# Patient Record
Sex: FEMALE | Race: WHITE | Employment: FULL TIME | URBAN - METROPOLITAN AREA
[De-identification: names, ages, dates, MRNs, and addresses within clinical notes are randomized per-mention and may not be internally consistent; named-entity substitution may affect disease eponyms.]

---

## 2020-03-09 ENCOUNTER — HOSPITAL ENCOUNTER (OUTPATIENT)
Dept: GENERAL RADIOLOGY | Age: 40
Discharge: HOME | End: 2020-03-09
Attending: INTERNAL MEDICINE
Payer: COMMERCIAL

## 2020-03-09 DIAGNOSIS — R13.10 DYSPHAGIA, UNSPECIFIED: ICD-10-CM

## 2020-03-09 PROCEDURE — 74011000255 HC RX REV CODE- 255: Performed by: INTERNAL MEDICINE

## 2020-03-09 PROCEDURE — 74220 X-RAY XM ESOPHAGUS 1CNTRST: CPT

## 2020-03-09 PROCEDURE — 74011000250 HC RX REV CODE- 250: Performed by: INTERNAL MEDICINE

## 2020-03-09 RX ADMIN — BARIUM SULFATE 700 MG: 700 TABLET ORAL at 08:40

## 2020-03-09 RX ADMIN — BARIUM SULFATE 355 ML: 0.6 SUSPENSION ORAL at 08:40

## 2020-03-09 RX ADMIN — ANTACID/ANTIFLATULENT 4 G: 380; 550; 10; 10 GRANULE, EFFERVESCENT ORAL at 08:40

## 2020-03-09 RX ADMIN — BARIUM SULFATE 135 ML: 980 POWDER, FOR SUSPENSION ORAL at 08:40

## 2020-05-01 ENCOUNTER — HOSPITAL ENCOUNTER (OUTPATIENT)
Dept: LAB | Age: 40
Discharge: HOME | End: 2020-05-01

## 2020-05-01 PROCEDURE — 88312 SPECIAL STAINS GROUP 1: CPT

## 2020-05-01 PROCEDURE — 88305 TISSUE EXAM BY PATHOLOGIST: CPT

## 2020-10-09 RX ORDER — LIDOCAINE HYDROCHLORIDE 20 MG/ML
15 SOLUTION OROPHARYNGEAL AS NEEDED
Status: CANCELLED | OUTPATIENT
Start: 2020-10-09

## 2020-11-06 NOTE — PROGRESS NOTES
Called pt at this time to remind her of scheduled procedure on Monday. Instructed pt to arrive at the outpatient building 30 min ahead of scheduled procedure. Pt verbalized understanding.

## 2020-11-09 ENCOUNTER — HOSPITAL ENCOUNTER (OUTPATIENT)
Age: 40
Setting detail: OUTPATIENT SURGERY
Discharge: HOME | End: 2020-11-09
Attending: INTERNAL MEDICINE | Admitting: INTERNAL MEDICINE
Payer: COMMERCIAL

## 2020-11-09 PROCEDURE — 91034 GASTROESOPHAGEAL REFLUX TEST: CPT | Performed by: INTERNAL MEDICINE

## 2020-11-09 PROCEDURE — 74011000250 HC RX REV CODE- 250: Performed by: INTERNAL MEDICINE

## 2020-11-09 PROCEDURE — 91010 ESOPHAGUS MOTILITY STUDY: CPT | Performed by: INTERNAL MEDICINE

## 2020-11-09 PROCEDURE — 77030031717 HC CATH PRB COMFORTEC DISP SAND -B: Performed by: INTERNAL MEDICINE

## 2020-11-09 PROCEDURE — 2709999900 HC NON-CHARGEABLE SUPPLY: Performed by: INTERNAL MEDICINE

## 2020-11-09 RX ORDER — OMEPRAZOLE 40 MG/1
40 CAPSULE, DELAYED RELEASE ORAL DAILY
COMMUNITY

## 2020-11-09 RX ORDER — LIDOCAINE HYDROCHLORIDE 20 MG/ML
15 SOLUTION OROPHARYNGEAL AS NEEDED
Status: DISCONTINUED | OUTPATIENT
Start: 2020-11-09 | End: 2020-11-09 | Stop reason: HOSPADM

## 2020-11-09 RX ADMIN — LIDOCAINE HYDROCHLORIDE 15 ML: 20 SOLUTION ORAL; TOPICAL at 08:05

## 2020-11-09 NOTE — PROGRESS NOTES
Pt tolerated Manometry probe insertion at 45 cm. Test performed w/o issue. Pt tolerated pH probe insertion at 36 cm, gastric pH 2.3. Instructions given to patient and recording device explained. Pt instructed to return tomorrow after 11/10/20 at 845am for probe removal and bring paperwork with. Pt d/c in personal car.

## 2020-11-10 ENCOUNTER — HOSPITAL ENCOUNTER (OUTPATIENT)
Age: 40
Setting detail: OUTPATIENT SURGERY
Discharge: HOME | End: 2020-11-10
Attending: INTERNAL MEDICINE | Admitting: INTERNAL MEDICINE
Payer: COMMERCIAL

## 2020-11-10 PROCEDURE — 99211 OFF/OP EST MAY X REQ PHY/QHP: CPT | Performed by: INTERNAL MEDICINE

## 2020-11-10 NOTE — PROGRESS NOTES
Patient arrived to have [facility] 30 probe removed. Probe removed without difficulty and no complaints from patient.

## 2020-11-12 NOTE — PROCEDURES
[facility]  PROCEDURE NOTE    Name:  Xochilt Moran  MR#:  [id number]  :  1980  ACCOUNT #:  [de-identified]  DATE OF SERVICE:  2020    PREOPERATIVE DIAGNOSES:  Dysphagia and aspiration. POSTOPERATIVE DIAGNOSES:  1. Abnormal acid reflux with 9% upright and 6.8% total acidification time. 2.  DeMeester score 22.5, which is elevated above normal (less than 14.7). 3.  Increased liquid impedance episodes with 96 total.  4.  Association probability for cough 57%, phlegm 99.5% and dysphagia 100%. PROCEDURE PERFORMED:  24-hour pH impedance probe study. PROCEDURE:  After obtaining informed consent, the patient underwent nasal intubation with manometric probe. The pH was acidic and the patient was off antisecretory medication for over a weak. The duration of the study was 23 hours 9 minutes of which 8 hours 5 minutes were recumbent, 15 hours 4 minutes were upright. During this time, the patient had 54 upright and one recumbent episode of acid reflux. The recumbent episode was actually 13.8 minutes, which resulted in 2.8% acidification while supine, which is above the normal of less than 1.2%. The majority of the reflux was while upright for a 9% acidification time resulting in a 6.8% total acidification time, well above the normal range of 4.2. This led to a composite DeMeester score of 22.5, which is above the normal of 14.7. Impedance data showed 96 liquid reflux episodes with normal less than 73 while off antisecretory medication. 77 were acidic, 19 were non-acidic; 95 were upright and one recumbent. During this time, the patient had 14 episodes of cough, 17 of phlegm, 59 of dysphagia, one of regurgitation. The correlation with impedance liquid events was symptom index of 57% for cough, 71% for phlegm, 76% for dysphagia, 0% for regurgitation and vomiting. The association probability was 57% for cough, 99.5% for phlegm and 100% for dysphagia.   The patient's definition of dysphagia would be of interest here as they had 59 episodes during this brief study. I wonder if globus sensation is what they are describing, but it is clearly quite well correlated with acidic and non-acidic impedance reflux. DISPOSITION:  Further followup per Dr. Samule Shone.       Amena Barakat MD      [id number]  D:  11/12/2020 9:31  T:  11/12/2020 11:48  JOB #:  [id number]  CC:  MD Renetta Velazquez MD

## 2020-11-12 NOTE — PROCEDURES
[address] [facility]  PROCEDURE NOTE    Name:  Julissa Carcamo  MR#:  [id number]  :  1980  ACCOUNT #:  [de-identified]  DATE OF SERVICE:  2020    PREOPERATIVE DIAGNOSES:  Dysphagia and aspiration. POSTOPERATIVE DIAGNOSES:  1. Hiatal hernia present - 4.1 cm.  2.  Normal lower esophageal sphincter pressure and relaxation. 3.  Nonspecific motility disturbance with relatively normal findings for viscous swallows, but 50% of liquid swallows were weak or failed - basically ineffective. PROCEDURE PERFORMED:  High-resolution impedance manometry. SURGEON:  Kera Nagy MD    PROCEDURE:  After obtaining informed consent, the patient underwent nasal intubation with manometric probe. The lower esophageal sphincter was located between 41.4 and 44.4 cm. There was a relatively large hiatal hernia of about 4.1 cm present. The lower esophageal sphincter averaged 17 mmHg at mid respiration. The IRP averaged 6 indicating normal contractility at rest and relaxation with wet swallow. 10 wet swallows showed that virtually every other one failed. They would be normal and then failed normal than failed with vigorous contractions on the normal swallows. The DCI was around 2000 for the normal swallows and completely failed for the abnormal giving a mean DCI of 1231. Impedance data showed only 30% of the liquid swallows had complete transit with an exit. 80% of viscous swallows showed an exit. The motility was definitely improved with viscous swallows. The upper esophageal sphincter pressure was 53, which is within normal limits. DISPOSITION:  Further followup per Dr. Abhijeet Simmons.       Glenna Wren MD      [id number]  D:  2020 15:49  T:  2020 1:16  JOB #:  [id number]  CC:  MD Janneth Vasquez MD

## 2020-12-03 ENCOUNTER — APPOINTMENT (RX ONLY)
Dept: URBAN - METROPOLITAN AREA CLINIC 24 | Facility: CLINIC | Age: 40
Setting detail: DERMATOLOGY
End: 2020-12-03

## 2020-12-03 DIAGNOSIS — L72.0 EPIDERMAL CYST: ICD-10-CM

## 2020-12-03 DIAGNOSIS — D485 NEOPLASM OF UNCERTAIN BEHAVIOR OF SKIN: ICD-10-CM

## 2020-12-03 PROBLEM — D48.5 NEOPLASM OF UNCERTAIN BEHAVIOR OF SKIN: Status: ACTIVE | Noted: 2020-12-03

## 2020-12-03 PROCEDURE — 11102 TANGNTL BX SKIN SINGLE LES: CPT

## 2020-12-03 PROCEDURE — ? BIOPSY BY SHAVE METHOD

## 2020-12-03 ASSESSMENT — LOCATION DETAILED DESCRIPTION DERM
LOCATION DETAILED: LEFT ANTERIOR SHOULDER
LOCATION DETAILED: LEFT CENTRAL MALAR CHEEK

## 2020-12-03 ASSESSMENT — LOCATION SIMPLE DESCRIPTION DERM
LOCATION SIMPLE: LEFT CHEEK
LOCATION SIMPLE: LEFT SHOULDER

## 2020-12-03 ASSESSMENT — LOCATION ZONE DERM
LOCATION ZONE: FACE
LOCATION ZONE: ARM

## 2020-12-03 NOTE — PROCEDURE: BIOPSY BY SHAVE METHOD
Notification Instructions: Patient will be notified of biopsy results. However, patient instructed to call the office if not contacted within 2 weeks.
Validate That Anesthesia Is Not 0: No
Cryotherapy Text: The wound bed was treated with cryotherapy after the biopsy was performed.
Was A Bandage Applied: Yes
Electrodesiccation Text: The wound bed was treated with electrodesiccation after the biopsy was performed.
Billing Type: Third-Party Bill
Accession #: [id number]
Post-Care Instructions: I reviewed with the patient in detail post-care instructions. Patient is to keep the biopsy site dry overnight, and then apply bacitracin twice daily until healed. Patient may apply hydrogen peroxide soaks to remove any crusting.
Hemostasis: Electrocautery
Biopsy Type: H and E
Electrodesiccation And Curettage Text: The wound bed was treated with electrodesiccation and curettage after the biopsy was performed.
Wound Care: Petrolatum
Silver Nitrate Text: The wound bed was treated with silver nitrate after the biopsy was performed.
Type Of Destruction Used: Curettage
Biopsy Method: Dermablade
Dressing: bandage
Anesthesia Type: 1% lidocaine without epinephrine
Depth Of Biopsy: dermis
Information: Selecting Yes will display possible errors in your note based on the variables you have selected. This validation is only offered as a suggestion for you. PLEASE NOTE THAT THE VALIDATION TEXT WILL BE REMOVED WHEN YOU FINALIZE YOUR NOTE. IF YOU WANT TO FAX A PRELIMINARY NOTE YOU WILL NEED TO TOGGLE THIS TO 'NO' IF YOU DO NOT WANT IT IN YOUR FAXED NOTE.
Path Notes (To The Dermatopathologist): Please check margins.
Additional Anesthesia Volume In Cc (Will Not Render If 0): 0
Consent: Written consent was obtained and risks were reviewed including but not limited to scarring, infection, bleeding, scabbing, incomplete removal, nerve damage and allergy to anesthesia.
Anesthesia Volume In Cc: 0.8
Detail Level: Detailed

## 2020-12-17 ENCOUNTER — APPOINTMENT (RX ONLY)
Dept: URBAN - METROPOLITAN AREA CLINIC 24 | Facility: CLINIC | Age: 40
Setting detail: DERMATOLOGY
End: 2020-12-17

## 2020-12-17 DIAGNOSIS — L81.4 OTHER MELANIN HYPERPIGMENTATION: ICD-10-CM

## 2020-12-17 DIAGNOSIS — Z71.89 OTHER SPECIFIED COUNSELING: ICD-10-CM

## 2020-12-17 DIAGNOSIS — D22 MELANOCYTIC NEVI: ICD-10-CM

## 2020-12-17 DIAGNOSIS — D485 NEOPLASM OF UNCERTAIN BEHAVIOR OF SKIN: ICD-10-CM

## 2020-12-17 DIAGNOSIS — D18.0 HEMANGIOMA: ICD-10-CM

## 2020-12-17 DIAGNOSIS — L72.0 EPIDERMAL CYST: ICD-10-CM

## 2020-12-17 PROBLEM — D18.01 HEMANGIOMA OF SKIN AND SUBCUTANEOUS TISSUE: Status: ACTIVE | Noted: 2020-12-17

## 2020-12-17 PROBLEM — D48.5 NEOPLASM OF UNCERTAIN BEHAVIOR OF SKIN: Status: ACTIVE | Noted: 2020-12-17

## 2020-12-17 PROBLEM — J30.1 ALLERGIC RHINITIS DUE TO POLLEN: Status: ACTIVE | Noted: 2020-12-17

## 2020-12-17 PROBLEM — D22.72 MELANOCYTIC NEVI OF LEFT LOWER LIMB, INCLUDING HIP: Status: ACTIVE | Noted: 2020-12-17

## 2020-12-17 PROBLEM — D23.71 OTHER BENIGN NEOPLASM OF SKIN OF RIGHT LOWER LIMB, INCLUDING HIP: Status: ACTIVE | Noted: 2020-12-17

## 2020-12-17 PROCEDURE — 99213 OFFICE O/P EST LOW 20 MIN: CPT | Mod: 25

## 2020-12-17 PROCEDURE — ? BIOPSY BY SHAVE METHOD

## 2020-12-17 PROCEDURE — 11102 TANGNTL BX SKIN SINGLE LES: CPT

## 2020-12-17 PROCEDURE — ? COUNSELING

## 2020-12-17 ASSESSMENT — LOCATION SIMPLE DESCRIPTION DERM
LOCATION SIMPLE: RIGHT CALF
LOCATION SIMPLE: LEFT PLANTAR SURFACE
LOCATION SIMPLE: RIGHT SHOULDER
LOCATION SIMPLE: UPPER BACK
LOCATION SIMPLE: LEFT EYELID
LOCATION SIMPLE: RIGHT THIGH
LOCATION SIMPLE: LEFT SHOULDER

## 2020-12-17 ASSESSMENT — LOCATION DETAILED DESCRIPTION DERM
LOCATION DETAILED: SUPERIOR THORACIC SPINE
LOCATION DETAILED: LEFT LATERAL CANTHUS
LOCATION DETAILED: RIGHT POSTERIOR SHOULDER
LOCATION DETAILED: RIGHT ANTERIOR PROXIMAL THIGH
LOCATION DETAILED: RIGHT DISTAL LATERAL CALF
LOCATION DETAILED: LEFT PLANTAR FOREFOOT OVERLYING 2ND METATARSAL
LOCATION DETAILED: LEFT POSTERIOR SHOULDER

## 2020-12-17 ASSESSMENT — LOCATION ZONE DERM
LOCATION ZONE: TRUNK
LOCATION ZONE: EYELID
LOCATION ZONE: LEG
LOCATION ZONE: ARM
LOCATION ZONE: FEET

## 2020-12-17 NOTE — PROCEDURE: BIOPSY BY SHAVE METHOD
Silver Nitrate Text: The wound bed was treated with silver nitrate after the biopsy was performed.
Post-Care Instructions: I reviewed with the patient in detail post-care instructions. Patient is to keep the biopsy site dry overnight, and then apply bacitracin twice daily until healed. Patient may apply hydrogen peroxide soaks to remove any crusting.
Validate Anticipated Plan: No
Depth Of Biopsy: dermis
Anesthesia Volume In Cc: 0.8
X Size Of Lesion In Cm: 0
Dressing: bandage
Validate Note Data (See Information Below): Yes
Wound Care: Petrolatum
Cryotherapy Text: The wound bed was treated with cryotherapy after the biopsy was performed.
Path Notes (To The Dermatopathologist): Please check margins.
Electrodesiccation Text: The wound bed was treated with electrodesiccation after the biopsy was performed.
Detail Level: Detailed
Size Of Lesion In Cm: 0.2
Billing Type: Third-Party Bill
Electrodesiccation And Curettage Text: The wound bed was treated with electrodesiccation and curettage after the biopsy was performed.
Biopsy Method: Dermablade
Anesthesia Type: 1% lidocaine without epinephrine
Consent: Written consent was obtained and risks were reviewed including but not limited to scarring, infection, bleeding, scabbing, incomplete removal, nerve damage and allergy to anesthesia.
Type Of Destruction Used: Curettage
Notification Instructions: Patient will be notified of biopsy results. However, patient instructed to call the office if not contacted within 2 weeks.
Accession #: [id number]
Information: Selecting Yes will display possible errors in your note based on the variables you have selected. This validation is only offered as a suggestion for you. PLEASE NOTE THAT THE VALIDATION TEXT WILL BE REMOVED WHEN YOU FINALIZE YOUR NOTE. IF YOU WANT TO FAX A PRELIMINARY NOTE YOU WILL NEED TO TOGGLE THIS TO 'NO' IF YOU DO NOT WANT IT IN YOUR FAXED NOTE.
Biopsy Type: H and E
Hemostasis: Electrocautery

## 2021-12-16 ENCOUNTER — APPOINTMENT (RX ONLY)
Dept: URBAN - METROPOLITAN AREA CLINIC 24 | Facility: CLINIC | Age: 41
Setting detail: DERMATOLOGY
End: 2021-12-16

## 2021-12-16 DIAGNOSIS — D22 MELANOCYTIC NEVI: ICD-10-CM

## 2021-12-16 DIAGNOSIS — L91.8 OTHER HYPERTROPHIC DISORDERS OF THE SKIN: ICD-10-CM

## 2021-12-16 DIAGNOSIS — L81.4 OTHER MELANIN HYPERPIGMENTATION: ICD-10-CM

## 2021-12-16 DIAGNOSIS — L85.3 XEROSIS CUTIS: ICD-10-CM

## 2021-12-16 DIAGNOSIS — Z71.89 OTHER SPECIFIED COUNSELING: ICD-10-CM

## 2021-12-16 DIAGNOSIS — D18.0 HEMANGIOMA: ICD-10-CM

## 2021-12-16 DIAGNOSIS — L98.8 OTHER SPECIFIED DISORDERS OF THE SKIN AND SUBCUTANEOUS TISSUE: ICD-10-CM

## 2021-12-16 PROBLEM — D22.72 MELANOCYTIC NEVI OF LEFT LOWER LIMB, INCLUDING HIP: Status: ACTIVE | Noted: 2021-12-16

## 2021-12-16 PROBLEM — L20.84 INTRINSIC (ALLERGIC) ECZEMA: Status: ACTIVE | Noted: 2021-12-16

## 2021-12-16 PROBLEM — D23.71 OTHER BENIGN NEOPLASM OF SKIN OF RIGHT LOWER LIMB, INCLUDING HIP: Status: ACTIVE | Noted: 2021-12-16

## 2021-12-16 PROBLEM — D18.01 HEMANGIOMA OF SKIN AND SUBCUTANEOUS TISSUE: Status: ACTIVE | Noted: 2021-12-16

## 2021-12-16 PROBLEM — F41.9 ANXIETY DISORDER, UNSPECIFIED: Status: ACTIVE | Noted: 2021-12-16

## 2021-12-16 PROCEDURE — ? COUNSELING

## 2021-12-16 PROCEDURE — 99213 OFFICE O/P EST LOW 20 MIN: CPT

## 2021-12-16 ASSESSMENT — LOCATION SIMPLE DESCRIPTION DERM
LOCATION SIMPLE: CHEST
LOCATION SIMPLE: UPPER BACK
LOCATION SIMPLE: RIGHT UPPER BACK
LOCATION SIMPLE: LEFT PLANTAR SURFACE
LOCATION SIMPLE: RIGHT CALF

## 2021-12-16 ASSESSMENT — LOCATION DETAILED DESCRIPTION DERM
LOCATION DETAILED: RIGHT DISTAL LATERAL CALF
LOCATION DETAILED: RIGHT INFERIOR UPPER BACK
LOCATION DETAILED: SUPERIOR THORACIC SPINE
LOCATION DETAILED: LEFT PLANTAR FOREFOOT OVERLYING 2ND METATARSAL
LOCATION DETAILED: MIDDLE STERNUM

## 2021-12-16 ASSESSMENT — LOCATION ZONE DERM
LOCATION ZONE: FEET
LOCATION ZONE: TRUNK
LOCATION ZONE: LEG

## 2025-02-11 ENCOUNTER — HOSPITAL ENCOUNTER (EMERGENCY)
Age: 45
Discharge: HOME | End: 2025-02-11
Attending: EMERGENCY MEDICINE
Payer: COMMERCIAL

## 2025-02-11 ENCOUNTER — APPOINTMENT (OUTPATIENT)
Dept: CT IMAGING | Age: 45
End: 2025-02-11
Payer: COMMERCIAL

## 2025-02-11 VITALS
RESPIRATION RATE: 18 BRPM | DIASTOLIC BLOOD PRESSURE: 93 MMHG | SYSTOLIC BLOOD PRESSURE: 131 MMHG | OXYGEN SATURATION: 100 % | TEMPERATURE: 98.2 F | HEART RATE: 77 BPM

## 2025-02-11 DIAGNOSIS — S06.0X1A CONCUSSION WITH LOSS OF CONSCIOUSNESS OF 30 MINUTES OR LESS, INITIAL ENCOUNTER: ICD-10-CM

## 2025-02-11 DIAGNOSIS — S09.90XA INJURY OF HEAD, INITIAL ENCOUNTER: Primary | ICD-10-CM

## 2025-02-11 LAB
ALBUMIN SERPL-MCNC: 4.1 G/DL (ref 3.5–5)
ALBUMIN/GLOB SERPL: 1.2 (ref 1–1.9)
ALP SERPL-CCNC: 67 U/L (ref 35–104)
ALT SERPL-CCNC: 21 U/L (ref 8–45)
ANION GAP SERPL CALC-SCNC: 15 MMOL/L (ref 7–16)
APTT PPP: 28.6 SEC (ref 23.3–37.4)
AST SERPL-CCNC: 25 U/L (ref 15–37)
BASOPHILS # BLD: 0.07 K/UL (ref 0–0.2)
BASOPHILS NFR BLD: 0.9 % (ref 0–2)
BILIRUB SERPL-MCNC: 0.6 MG/DL (ref 0–1.2)
BUN SERPL-MCNC: 10 MG/DL (ref 6–23)
CALCIUM SERPL-MCNC: 9.7 MG/DL (ref 8.8–10.2)
CHLORIDE SERPL-SCNC: 104 MMOL/L (ref 98–107)
CO2 SERPL-SCNC: 19 MMOL/L (ref 20–29)
CREAT SERPL-MCNC: 0.79 MG/DL (ref 0.6–1.1)
DIFFERENTIAL METHOD BLD: NORMAL
EKG ATRIAL RATE: 77 BPM
EKG DIAGNOSIS: NORMAL
EKG P AXIS: 70 DEGREES
EKG P-R INTERVAL: 133 MS
EKG Q-T INTERVAL: 404 MS
EKG QRS DURATION: 88 MS
EKG QTC CALCULATION (BAZETT): 461 MS
EKG R AXIS: 1 DEGREES
EKG T AXIS: 61 DEGREES
EKG VENTRICULAR RATE: 78 BPM
EOSINOPHIL # BLD: 0.12 K/UL (ref 0–0.8)
EOSINOPHIL NFR BLD: 1.6 % (ref 0.5–7.8)
ERYTHROCYTE [DISTWIDTH] IN BLOOD BY AUTOMATED COUNT: 11.9 % (ref 11.9–14.6)
GLOBULIN SER CALC-MCNC: 3.4 G/DL (ref 2.3–3.5)
GLUCOSE BLD STRIP.AUTO-MCNC: 92 MG/DL (ref 65–100)
GLUCOSE SERPL-MCNC: 97 MG/DL (ref 70–99)
HCT VFR BLD AUTO: 42.8 % (ref 35.8–46.3)
HGB BLD-MCNC: 14.9 G/DL (ref 11.7–15.4)
IMM GRANULOCYTES # BLD AUTO: 0.02 K/UL (ref 0–0.5)
IMM GRANULOCYTES NFR BLD AUTO: 0.3 % (ref 0–5)
INR PPP: 1
LYMPHOCYTES # BLD: 2.64 K/UL (ref 0.5–4.6)
LYMPHOCYTES NFR BLD: 34.1 % (ref 13–44)
MAGNESIUM SERPL-MCNC: 2.3 MG/DL (ref 1.8–2.4)
MCH RBC QN AUTO: 30.4 PG (ref 26.1–32.9)
MCHC RBC AUTO-ENTMCNC: 34.8 G/DL (ref 31.4–35)
MCV RBC AUTO: 87.3 FL (ref 82–102)
MONOCYTES # BLD: 0.76 K/UL (ref 0.1–1.3)
MONOCYTES NFR BLD: 9.8 % (ref 4–12)
NEUTS SEG # BLD: 4.13 K/UL (ref 1.7–8.2)
NEUTS SEG NFR BLD: 53.3 % (ref 43–78)
NRBC # BLD: 0 K/UL (ref 0–0.2)
PLATELET # BLD AUTO: 350 K/UL (ref 150–450)
PMV BLD AUTO: 9.4 FL (ref 9.4–12.3)
POTASSIUM SERPL-SCNC: 4.1 MMOL/L (ref 3.5–5.1)
PROT SERPL-MCNC: 7.6 G/DL (ref 6.3–8.2)
PROTHROMBIN TIME: 13.7 SEC (ref 11.3–14.9)
RBC # BLD AUTO: 4.9 M/UL (ref 4.05–5.2)
SERVICE CMNT-IMP: NORMAL
SODIUM SERPL-SCNC: 138 MMOL/L (ref 136–145)
WBC # BLD AUTO: 7.7 K/UL (ref 4.3–11.1)

## 2025-02-11 PROCEDURE — 80053 COMPREHEN METABOLIC PANEL: CPT

## 2025-02-11 PROCEDURE — 85610 PROTHROMBIN TIME: CPT

## 2025-02-11 PROCEDURE — 93010 ELECTROCARDIOGRAM REPORT: CPT | Performed by: INTERNAL MEDICINE

## 2025-02-11 PROCEDURE — 83735 ASSAY OF MAGNESIUM: CPT

## 2025-02-11 PROCEDURE — 82962 GLUCOSE BLOOD TEST: CPT

## 2025-02-11 PROCEDURE — 85730 THROMBOPLASTIN TIME PARTIAL: CPT

## 2025-02-11 PROCEDURE — 70450 CT HEAD/BRAIN W/O DYE: CPT

## 2025-02-11 PROCEDURE — 72125 CT NECK SPINE W/O DYE: CPT

## 2025-02-11 PROCEDURE — 99284 EMERGENCY DEPT VISIT MOD MDM: CPT

## 2025-02-11 PROCEDURE — 85025 COMPLETE CBC W/AUTO DIFF WBC: CPT

## 2025-02-11 PROCEDURE — 93005 ELECTROCARDIOGRAM TRACING: CPT | Performed by: EMERGENCY MEDICINE

## 2025-02-11 NOTE — ED PROVIDER NOTES
alignment.  On the coronal reconstructions, the odontoid is intact.  On the axial images, the central canal is widely patent at all levels.  The  foramen transversarium are patent. Lung apices are clear.      Impression        No fractures        Electronically signed by Zac Grady   CBC with Auto Differential   Result Value Ref Range    WBC 7.7 4.3 - 11.1 K/uL    RBC 4.90 4.05 - 5.2 M/uL    Hemoglobin 14.9 11.7 - 15.4 g/dL    Hematocrit 42.8 35.8 - 46.3 %    MCV 87.3 82 - 102 FL    MCH 30.4 26.1 - 32.9 PG    MCHC 34.8 31.4 - 35.0 g/dL    RDW 11.9 11.9 - 14.6 %    Platelets 350 150 - 450 K/uL    MPV 9.4 9.4 - 12.3 FL    nRBC 0.00 0.0 - 0.2 K/uL    Differential Type AUTOMATED      Neutrophils % 53.3 43.0 - 78.0 %    Lymphocytes % 34.1 13.0 - 44.0 %    Monocytes % 9.8 4.0 - 12.0 %    Eosinophils % 1.6 0.5 - 7.8 %    Basophils % 0.9 0.0 - 2.0 %    Immature Granulocytes % 0.3 0.0 - 5.0 %    Neutrophils Absolute 4.13 1.70 - 8.20 K/UL    Lymphocytes Absolute 2.64 0.50 - 4.60 K/UL    Monocytes Absolute 0.76 0.10 - 1.30 K/UL    Eosinophils Absolute 0.12 0.00 - 0.80 K/UL    Basophils Absolute 0.07 0.00 - 0.20 K/UL    Immature Granulocytes Absolute 0.02 0.0 - 0.5 K/UL   CMP   Result Value Ref Range    Sodium 138 136 - 145 mmol/L    Potassium 4.1 3.5 - 5.1 mmol/L    Chloride 104 98 - 107 mmol/L    CO2 19 (L) 20 - 29 mmol/L    Anion Gap 15 7 - 16 mmol/L    Glucose 97 70 - 99 mg/dL    BUN 10 6 - 23 MG/DL    Creatinine 0.79 0.60 - 1.10 MG/DL    Est, Glom Filt Rate >90 >60 ml/min/1.73m2    Calcium 9.7 8.8 - 10.2 MG/DL    Total Bilirubin 0.6 0.0 - 1.2 MG/DL    ALT 21 8 - 45 U/L    AST 25 15 - 37 U/L    Alk Phosphatase 67 35 - 104 U/L    Total Protein 7.6 6.3 - 8.2 g/dL    Albumin 4.1 3.5 - 5.0 g/dL    Globulin 3.4 2.3 - 3.5 g/dL    Albumin/Globulin Ratio 1.2 1.0 - 1.9     Magnesium   Result Value Ref Range    Magnesium 2.3 1.8 - 2.4 mg/dL   Protime-INR   Result Value Ref Range    Protime 13.7 11.3 - 14.9 sec    INR 1.0     APTT

## 2025-02-14 ENCOUNTER — HOSPITAL ENCOUNTER (OUTPATIENT)
Dept: GENERAL RADIOLOGY | Age: 45
Discharge: HOME | End: 2025-02-17
Payer: COMMERCIAL

## 2025-02-14 DIAGNOSIS — R10.9 ABDOMINAL PAIN OF UNKNOWN ETIOLOGY: ICD-10-CM

## 2025-02-14 PROCEDURE — 74018 RADEX ABDOMEN 1 VIEW: CPT

## 2025-07-11 SDOH — HEALTH STABILITY: PHYSICAL HEALTH: ON AVERAGE, HOW MANY DAYS PER WEEK DO YOU ENGAGE IN MODERATE TO STRENUOUS EXERCISE (LIKE A BRISK WALK)?: 0 DAYS

## 2025-07-11 SDOH — HEALTH STABILITY: PHYSICAL HEALTH: ON AVERAGE, HOW MANY MINUTES DO YOU ENGAGE IN EXERCISE AT THIS LEVEL?: 0 MIN

## 2025-07-14 ENCOUNTER — OFFICE VISIT (OUTPATIENT)
Dept: INTERNAL MEDICINE CLINIC | Facility: CLINIC | Age: 45
End: 2025-07-14
Payer: COMMERCIAL

## 2025-07-14 VITALS
OXYGEN SATURATION: 99 % | WEIGHT: 154 LBS | DIASTOLIC BLOOD PRESSURE: 78 MMHG | BODY MASS INDEX: 25.66 KG/M2 | SYSTOLIC BLOOD PRESSURE: 118 MMHG | HEIGHT: 65 IN | HEART RATE: 85 BPM

## 2025-07-14 DIAGNOSIS — N83.201 OVARIAN CYST, RIGHT: ICD-10-CM

## 2025-07-14 DIAGNOSIS — M62.81 GENERALIZED MUSCLE WEAKNESS: ICD-10-CM

## 2025-07-14 DIAGNOSIS — R53.82 CHRONIC FATIGUE: Primary | ICD-10-CM

## 2025-07-14 DIAGNOSIS — G47.33 OSA (OBSTRUCTIVE SLEEP APNEA): ICD-10-CM

## 2025-07-14 DIAGNOSIS — J45.20 MILD INTERMITTENT ASTHMA, UNCOMPLICATED: ICD-10-CM

## 2025-07-14 DIAGNOSIS — F41.8 DEPRESSION WITH ANXIETY: ICD-10-CM

## 2025-07-14 DIAGNOSIS — I10 ESSENTIAL HYPERTENSION: ICD-10-CM

## 2025-07-14 PROBLEM — Z91.018 CLASSIC IGE MEDIATED FOOD ALLERGY: Status: ACTIVE | Noted: 2020-09-04

## 2025-07-14 PROBLEM — M25.50 ARTHRALGIA OF MULTIPLE SITES: Status: ACTIVE | Noted: 2022-02-14

## 2025-07-14 PROBLEM — D80.6 ANTI-POLYSACCHARIDE ANTIBODY DEFICIENCY: Status: ACTIVE | Noted: 2019-07-09

## 2025-07-14 PROBLEM — R73.03 PREDIABETES: Status: ACTIVE | Noted: 2024-03-01

## 2025-07-14 PROBLEM — R51.9 CHRONIC DAILY HEADACHE: Status: ACTIVE | Noted: 2025-02-17

## 2025-07-14 PROBLEM — M35.2: Status: ACTIVE | Noted: 2025-05-15

## 2025-07-14 PROBLEM — F41.9 ANXIETY DISORDER: Status: ACTIVE | Noted: 2025-03-05

## 2025-07-14 PROBLEM — K59.00 CONSTIPATION: Status: ACTIVE | Noted: 2025-04-30

## 2025-07-14 PROBLEM — G37.9: Status: ACTIVE | Noted: 2025-05-15

## 2025-07-14 PROBLEM — H50.05 ALTERNATING ESOTROPIA: Status: ACTIVE | Noted: 2024-12-31

## 2025-07-14 PROCEDURE — 3074F SYST BP LT 130 MM HG: CPT | Performed by: PHYSICIAN ASSISTANT

## 2025-07-14 PROCEDURE — 99205 OFFICE O/P NEW HI 60 MIN: CPT | Performed by: PHYSICIAN ASSISTANT

## 2025-07-14 PROCEDURE — 3078F DIAST BP <80 MM HG: CPT | Performed by: PHYSICIAN ASSISTANT

## 2025-07-14 RX ORDER — MONTELUKAST SODIUM 10 MG/1
10 TABLET ORAL NIGHTLY
COMMUNITY

## 2025-07-14 RX ORDER — ESZOPICLONE 2 MG/1
2 TABLET, FILM COATED ORAL NIGHTLY
COMMUNITY

## 2025-07-14 RX ORDER — DROSPIRENONE 4 MG/1
4 TABLET, FILM COATED ORAL DAILY
COMMUNITY

## 2025-07-14 RX ORDER — LEVALBUTEROL INHALATION SOLUTION 0.63 MG/3ML
1 SOLUTION RESPIRATORY (INHALATION) EVERY 8 HOURS PRN
COMMUNITY

## 2025-07-14 RX ORDER — LEVALBUTEROL TARTRATE 45 UG/1
1-2 AEROSOL, METERED ORAL EVERY 4 HOURS PRN
COMMUNITY

## 2025-07-14 SDOH — ECONOMIC STABILITY: FOOD INSECURITY: WITHIN THE PAST 12 MONTHS, THE FOOD YOU BOUGHT JUST DIDN'T LAST AND YOU DIDN'T HAVE MONEY TO GET MORE.: NEVER TRUE

## 2025-07-14 SDOH — ECONOMIC STABILITY: FOOD INSECURITY: WITHIN THE PAST 12 MONTHS, YOU WORRIED THAT YOUR FOOD WOULD RUN OUT BEFORE YOU GOT MONEY TO BUY MORE.: NEVER TRUE

## 2025-07-14 ASSESSMENT — PATIENT HEALTH QUESTIONNAIRE - PHQ9
SUM OF ALL RESPONSES TO PHQ QUESTIONS 1-9: 18
SUM OF ALL RESPONSES TO PHQ QUESTIONS 1-9: 19
10. IF YOU CHECKED OFF ANY PROBLEMS, HOW DIFFICULT HAVE THESE PROBLEMS MADE IT FOR YOU TO DO YOUR WORK, TAKE CARE OF THINGS AT HOME, OR GET ALONG WITH OTHER PEOPLE: EXTREMELY DIFFICULT
2. FEELING DOWN, DEPRESSED OR HOPELESS: NEARLY EVERY DAY
9. THOUGHTS THAT YOU WOULD BE BETTER OFF DEAD, OR OF HURTING YOURSELF: SEVERAL DAYS
3. TROUBLE FALLING OR STAYING ASLEEP: SEVERAL DAYS
7. TROUBLE CONCENTRATING ON THINGS, SUCH AS READING THE NEWSPAPER OR WATCHING TELEVISION: SEVERAL DAYS
8. MOVING OR SPEAKING SO SLOWLY THAT OTHER PEOPLE COULD HAVE NOTICED. OR THE OPPOSITE, BEING SO FIGETY OR RESTLESS THAT YOU HAVE BEEN MOVING AROUND A LOT MORE THAN USUAL: SEVERAL DAYS
1. LITTLE INTEREST OR PLEASURE IN DOING THINGS: NEARLY EVERY DAY
5. POOR APPETITE OR OVEREATING: NEARLY EVERY DAY
6. FEELING BAD ABOUT YOURSELF - OR THAT YOU ARE A FAILURE OR HAVE LET YOURSELF OR YOUR FAMILY DOWN: NEARLY EVERY DAY
4. FEELING TIRED OR HAVING LITTLE ENERGY: NEARLY EVERY DAY

## 2025-07-14 ASSESSMENT — COLUMBIA-SUICIDE SEVERITY RATING SCALE - C-SSRS
1. WITHIN THE PAST MONTH, HAVE YOU WISHED YOU WERE DEAD OR WISHED YOU COULD GO TO SLEEP AND NOT WAKE UP?: YES
6. HAVE YOU EVER DONE ANYTHING, STARTED TO DO ANYTHING, OR PREPARED TO DO ANYTHING TO END YOUR LIFE?: NO
2. HAVE YOU ACTUALLY HAD ANY THOUGHTS OF KILLING YOURSELF?: NO

## 2025-07-14 NOTE — PROGRESS NOTES
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    Trigeminal Neuralgia  - She has been experiencing health issues since October 2024, starting with a sharp pain in her right jaw.  - This pain, described as severe, was not triggered by any injury.  - Similar episodes occurred in 2018 and 2019 but were less intense.  - She can palpate a bone in the vestibule.  - She sought help from urgent care, who referred her to an ENT specialist.  - After undergoing a CT scan and MRI of her brain, she was referred to a neurologist at [facility].  - Despite six visits, no diagnosis was made.  - The neurologist suggested trigeminal neuralgia and prescribed several medications, all of which caused adverse reactions.  - She then consulted a new primary care physician who referred her to [facility].  - She had a consultation in 03/2025 and returned in 04/2025 for testing, which included blood work, MRIs of the brain, C-spine, and T-spine, and a lumbar puncture.  - The radiologist suggested a demyelinating disease, but she did not meet the criteria for lesions in her C-spine or T-spine.  - She was offered disease-modifying medication but declined due to the lack of a definitive diagnosis.  - She was prescribed Trileptal, which exacerbated her fatigue and made her feel immobile.  - She also experiences movement issues with her legs and feet, making it difficult to shower.  - She continued working until 12/11/2024, when she had to stop due to her body giving out.  - As a dental hygienist, she was dropping instruments due to balance issues, numbness, tingling, and burning sensations.  - She was scheduled

## 2025-07-24 ENCOUNTER — OFFICE VISIT (OUTPATIENT)
Dept: BEHAVIORAL/MENTAL HEALTH CLINIC | Age: 45
End: 2025-07-24
Payer: COMMERCIAL

## 2025-07-24 VITALS
DIASTOLIC BLOOD PRESSURE: 96 MMHG | BODY MASS INDEX: 25.33 KG/M2 | TEMPERATURE: 98.4 F | OXYGEN SATURATION: 90 % | HEART RATE: 72 BPM | SYSTOLIC BLOOD PRESSURE: 130 MMHG | WEIGHT: 152.2 LBS

## 2025-07-24 DIAGNOSIS — F33.2 MDD (MAJOR DEPRESSIVE DISORDER), RECURRENT SEVERE, WITHOUT PSYCHOSIS (HCC): Primary | ICD-10-CM

## 2025-07-24 PROCEDURE — 90792 PSYCH DIAG EVAL W/MED SRVCS: CPT | Performed by: STUDENT IN AN ORGANIZED HEALTH CARE EDUCATION/TRAINING PROGRAM

## 2025-07-24 ASSESSMENT — ANXIETY QUESTIONNAIRES
1. FEELING NERVOUS, ANXIOUS, OR ON EDGE: NEARLY EVERY DAY
5. BEING SO RESTLESS THAT IT IS HARD TO SIT STILL: NOT AT ALL
4. TROUBLE RELAXING: NEARLY EVERY DAY
GAD7 TOTAL SCORE: 12
2. NOT BEING ABLE TO STOP OR CONTROL WORRYING: NEARLY EVERY DAY
7. FEELING AFRAID AS IF SOMETHING AWFUL MIGHT HAPPEN: NOT AT ALL
3. WORRYING TOO MUCH ABOUT DIFFERENT THINGS: NOT AT ALL
6. BECOMING EASILY ANNOYED OR IRRITABLE: NEARLY EVERY DAY
3. WORRYING TOO MUCH ABOUT DIFFERENT THINGS: NOT AT ALL
IF YOU CHECKED OFF ANY PROBLEMS ON THIS QUESTIONNAIRE, HOW DIFFICULT HAVE THESE PROBLEMS MADE IT FOR YOU TO DO YOUR WORK, TAKE CARE OF THINGS AT HOME, OR GET ALONG WITH OTHER PEOPLE: EXTREMELY DIFFICULT
2. NOT BEING ABLE TO STOP OR CONTROL WORRYING: NEARLY EVERY DAY
5. BEING SO RESTLESS THAT IT IS HARD TO SIT STILL: NOT AT ALL
4. TROUBLE RELAXING: NEARLY EVERY DAY
7. FEELING AFRAID AS IF SOMETHING AWFUL MIGHT HAPPEN: NOT AT ALL
1. FEELING NERVOUS, ANXIOUS, OR ON EDGE: NEARLY EVERY DAY
IF YOU CHECKED OFF ANY PROBLEMS ON THIS QUESTIONNAIRE, HOW DIFFICULT HAVE THESE PROBLEMS MADE IT FOR YOU TO DO YOUR WORK, TAKE CARE OF THINGS AT HOME, OR GET ALONG WITH OTHER PEOPLE: EXTREMELY DIFFICULT
6. BECOMING EASILY ANNOYED OR IRRITABLE: NEARLY EVERY DAY

## 2025-07-24 ASSESSMENT — PATIENT HEALTH QUESTIONNAIRE - PHQ9
5. POOR APPETITE OR OVEREATING: NEARLY EVERY DAY
9. THOUGHTS THAT YOU WOULD BE BETTER OFF DEAD, OR OF HURTING YOURSELF: NEARLY EVERY DAY
SUM OF ALL RESPONSES TO PHQ QUESTIONS 1-9: 21
SUM OF ALL RESPONSES TO PHQ QUESTIONS 1-9: 21
7. TROUBLE CONCENTRATING ON THINGS, SUCH AS READING THE NEWSPAPER OR WATCHING TELEVISION: SEVERAL DAYS
SUM OF ALL RESPONSES TO PHQ QUESTIONS 1-9: 18
7. TROUBLE CONCENTRATING ON THINGS, SUCH AS READING THE NEWSPAPER OR WATCHING TELEVISION: SEVERAL DAYS
1. LITTLE INTEREST OR PLEASURE IN DOING THINGS: NEARLY EVERY DAY
8. MOVING OR SPEAKING SO SLOWLY THAT OTHER PEOPLE COULD HAVE NOTICED. OR THE OPPOSITE, BEING SO FIGETY OR RESTLESS THAT YOU HAVE BEEN MOVING AROUND A LOT MORE THAN USUAL: SEVERAL DAYS
SUM OF ALL RESPONSES TO PHQ QUESTIONS 1-9: 21
3. TROUBLE FALLING OR STAYING ASLEEP: SEVERAL DAYS
2. FEELING DOWN, DEPRESSED OR HOPELESS: NEARLY EVERY DAY
2. FEELING DOWN, DEPRESSED OR HOPELESS: NEARLY EVERY DAY
6. FEELING BAD ABOUT YOURSELF - OR THAT YOU ARE A FAILURE OR HAVE LET YOURSELF OR YOUR FAMILY DOWN: NEARLY EVERY DAY
SUM OF ALL RESPONSES TO PHQ QUESTIONS 1-9: 21
1. LITTLE INTEREST OR PLEASURE IN DOING THINGS: NEARLY EVERY DAY
10. IF YOU CHECKED OFF ANY PROBLEMS, HOW DIFFICULT HAVE THESE PROBLEMS MADE IT FOR YOU TO DO YOUR WORK, TAKE CARE OF THINGS AT HOME, OR GET ALONG WITH OTHER PEOPLE: EXTREMELY DIFFICULT
4. FEELING TIRED OR HAVING LITTLE ENERGY: NEARLY EVERY DAY
4. FEELING TIRED OR HAVING LITTLE ENERGY: NEARLY EVERY DAY
9. THOUGHTS THAT YOU WOULD BE BETTER OFF DEAD, OR OF HURTING YOURSELF: NEARLY EVERY DAY
3. TROUBLE FALLING OR STAYING ASLEEP: SEVERAL DAYS
6. FEELING BAD ABOUT YOURSELF - OR THAT YOU ARE A FAILURE OR HAVE LET YOURSELF OR YOUR FAMILY DOWN: NEARLY EVERY DAY
10. IF YOU CHECKED OFF ANY PROBLEMS, HOW DIFFICULT HAVE THESE PROBLEMS MADE IT FOR YOU TO DO YOUR WORK, TAKE CARE OF THINGS AT HOME, OR GET ALONG WITH OTHER PEOPLE: EXTREMELY DIFFICULT
8. MOVING OR SPEAKING SO SLOWLY THAT OTHER PEOPLE COULD HAVE NOTICED. OR THE OPPOSITE - BEING SO FIDGETY OR RESTLESS THAT YOU HAVE BEEN MOVING AROUND A LOT MORE THAN USUAL: SEVERAL DAYS
5. POOR APPETITE OR OVEREATING: NEARLY EVERY DAY

## 2025-07-24 ASSESSMENT — COLUMBIA-SUICIDE SEVERITY RATING SCALE - C-SSRS
2. IN THE PAST MONTH, HAVE YOU ACTUALLY HAD ANY THOUGHTS OF KILLING YOURSELF?: NO
7. DID THIS OCCUR IN THE LAST THREE MONTHS: NO
6. IN YOUR LIFETIME, HAVE YOU EVER DONE ANYTHING, STARTED TO DO ANYTHING, OR PREPARED TO DO ANYTHING TO END YOUR LIFE?: YES
1. IN THE PAST MONTH, HAVE YOU WISHED YOU WERE DEAD OR WISHED YOU COULD GO TO SLEEP AND NOT WAKE UP?: YES

## 2025-07-24 ASSESSMENT — LIFESTYLE VARIABLES
HAVE YOU EVER RECEIVED ALCOHOL OR OTHER DRUG ABUSE TREATMENT: NO
PAST THREE MONTHS WHAT IS THE LARGEST AMOUNT OF ALCOHOLIC DRINKS YOU HAVE CONSUMED IN ONE DAY: 0
ALCOHOL_DAYS_PER_WEEK: 0

## 2025-07-24 NOTE — PROGRESS NOTES
02:57 PM    EOSPCT 1.6 02/11/2025 02:57 PM    BASOPCT 0.9 02/11/2025 02:57 PM    DIFFTYPE AUTOMATED 02/11/2025 02:57 PM         Lab Results   Component Value Date    GLUCOSE 97 02/11/2025       All pertinent/available labs reviewed.          Mental Status Examination    General/Appearance: Appears stated age, Well-kept, and Appropriately attired; wearing tinted eye protection    Behavior: cooperative, engaged    Eye Contact: fair (wearing dark tinted glasses due to light sensitivity)    Psychomotor: Slowed    Musculoskeletal: using rolling walker    Speech/Language: Within normal limits    Mood: \"hopeless\"    Affect: Appropriate, Congruent, and Restricted range    Thought process: linear, goal directed, and coherent    Thought content: reports frequent passive SI, but denies active SI/HI, A/VH, paranoia or delusions    Orientation: oriented to person, place, and time    Memory: Intact long-term and Intact short-term    Attention/Concentration: Sustained    Fund of knowledge: fair    Judgement: fair    Insight: fair         Questionnaire Findings:    PHQ9: 21 (7/24/25)    GAD7: 12 (7/24/25)         Assessment/Summary of Findings:    Caorlyn Cooper is a 45 y.o. female with reported prior psychiatric history significant for depression, significant somatic symptoms who presents for initial evaluation. Pt reports that they are not currently prescribed psychiatric medication.    Pt reports a hx of significant depression that has developed following significant exacerbation of somatic symptoms over the past 10 yrs, expressing that she has experienced highly impairing neurological symptoms since Oct '24. She currently reports severe depression and will experience frequent passive SI, but denies active SI/HI, A/VH, paranoia or delusions. She has reportedly undergone extensive medical workup without clear diagnostic certainty, which has led to frustration for pt. Due to the impact of these symptoms, she has not been

## 2025-08-18 ASSESSMENT — PATIENT HEALTH QUESTIONNAIRE - PHQ9
6. FEELING BAD ABOUT YOURSELF - OR THAT YOU ARE A FAILURE OR HAVE LET YOURSELF OR YOUR FAMILY DOWN: MORE THAN HALF THE DAYS
2. FEELING DOWN, DEPRESSED OR HOPELESS: MORE THAN HALF THE DAYS
8. MOVING OR SPEAKING SO SLOWLY THAT OTHER PEOPLE COULD HAVE NOTICED. OR THE OPPOSITE - BEING SO FIDGETY OR RESTLESS THAT YOU HAVE BEEN MOVING AROUND A LOT MORE THAN USUAL: MORE THAN HALF THE DAYS
2. FEELING DOWN, DEPRESSED OR HOPELESS: MORE THAN HALF THE DAYS
3. TROUBLE FALLING OR STAYING ASLEEP: SEVERAL DAYS
5. POOR APPETITE OR OVEREATING: NEARLY EVERY DAY
SUM OF ALL RESPONSES TO PHQ QUESTIONS 1-9: 19
8. MOVING OR SPEAKING SO SLOWLY THAT OTHER PEOPLE COULD HAVE NOTICED. OR THE OPPOSITE, BEING SO FIGETY OR RESTLESS THAT YOU HAVE BEEN MOVING AROUND A LOT MORE THAN USUAL: MORE THAN HALF THE DAYS
7. TROUBLE CONCENTRATING ON THINGS, SUCH AS READING THE NEWSPAPER OR WATCHING TELEVISION: MORE THAN HALF THE DAYS
9. THOUGHTS THAT YOU WOULD BE BETTER OFF DEAD, OR OF HURTING YOURSELF: MORE THAN HALF THE DAYS
1. LITTLE INTEREST OR PLEASURE IN DOING THINGS: MORE THAN HALF THE DAYS
6. FEELING BAD ABOUT YOURSELF - OR THAT YOU ARE A FAILURE OR HAVE LET YOURSELF OR YOUR FAMILY DOWN: MORE THAN HALF THE DAYS
3. TROUBLE FALLING OR STAYING ASLEEP: SEVERAL DAYS
4. FEELING TIRED OR HAVING LITTLE ENERGY: NEARLY EVERY DAY
SUM OF ALL RESPONSES TO PHQ QUESTIONS 1-9: 19
10. IF YOU CHECKED OFF ANY PROBLEMS, HOW DIFFICULT HAVE THESE PROBLEMS MADE IT FOR YOU TO DO YOUR WORK, TAKE CARE OF THINGS AT HOME, OR GET ALONG WITH OTHER PEOPLE: VERY DIFFICULT
SUM OF ALL RESPONSES TO PHQ QUESTIONS 1-9: 19
7. TROUBLE CONCENTRATING ON THINGS, SUCH AS READING THE NEWSPAPER OR WATCHING TELEVISION: MORE THAN HALF THE DAYS
10. IF YOU CHECKED OFF ANY PROBLEMS, HOW DIFFICULT HAVE THESE PROBLEMS MADE IT FOR YOU TO DO YOUR WORK, TAKE CARE OF THINGS AT HOME, OR GET ALONG WITH OTHER PEOPLE: VERY DIFFICULT
9. THOUGHTS THAT YOU WOULD BE BETTER OFF DEAD, OR OF HURTING YOURSELF: MORE THAN HALF THE DAYS
1. LITTLE INTEREST OR PLEASURE IN DOING THINGS: MORE THAN HALF THE DAYS
SUM OF ALL RESPONSES TO PHQ QUESTIONS 1-9: 19
4. FEELING TIRED OR HAVING LITTLE ENERGY: NEARLY EVERY DAY
5. POOR APPETITE OR OVEREATING: NEARLY EVERY DAY
SUM OF ALL RESPONSES TO PHQ QUESTIONS 1-9: 17

## 2025-08-18 ASSESSMENT — ANXIETY QUESTIONNAIRES
6. BECOMING EASILY ANNOYED OR IRRITABLE: SEVERAL DAYS
IF YOU CHECKED OFF ANY PROBLEMS ON THIS QUESTIONNAIRE, HOW DIFFICULT HAVE THESE PROBLEMS MADE IT FOR YOU TO DO YOUR WORK, TAKE CARE OF THINGS AT HOME, OR GET ALONG WITH OTHER PEOPLE: VERY DIFFICULT
1. FEELING NERVOUS, ANXIOUS, OR ON EDGE: SEVERAL DAYS
7. FEELING AFRAID AS IF SOMETHING AWFUL MIGHT HAPPEN: NOT AT ALL
IF YOU CHECKED OFF ANY PROBLEMS ON THIS QUESTIONNAIRE, HOW DIFFICULT HAVE THESE PROBLEMS MADE IT FOR YOU TO DO YOUR WORK, TAKE CARE OF THINGS AT HOME, OR GET ALONG WITH OTHER PEOPLE: VERY DIFFICULT
3. WORRYING TOO MUCH ABOUT DIFFERENT THINGS: SEVERAL DAYS
6. BECOMING EASILY ANNOYED OR IRRITABLE: SEVERAL DAYS
2. NOT BEING ABLE TO STOP OR CONTROL WORRYING: SEVERAL DAYS
4. TROUBLE RELAXING: SEVERAL DAYS
3. WORRYING TOO MUCH ABOUT DIFFERENT THINGS: SEVERAL DAYS
GAD7 TOTAL SCORE: 5
5. BEING SO RESTLESS THAT IT IS HARD TO SIT STILL: NOT AT ALL
2. NOT BEING ABLE TO STOP OR CONTROL WORRYING: SEVERAL DAYS
1. FEELING NERVOUS, ANXIOUS, OR ON EDGE: SEVERAL DAYS
7. FEELING AFRAID AS IF SOMETHING AWFUL MIGHT HAPPEN: NOT AT ALL
4. TROUBLE RELAXING: SEVERAL DAYS
5. BEING SO RESTLESS THAT IT IS HARD TO SIT STILL: NOT AT ALL

## 2025-08-18 ASSESSMENT — COLUMBIA-SUICIDE SEVERITY RATING SCALE - C-SSRS
7. DID THIS OCCUR IN THE LAST THREE MONTHS: NO
6. IN YOUR LIFETIME, HAVE YOU EVER DONE ANYTHING, STARTED TO DO ANYTHING, OR PREPARED TO DO ANYTHING TO END YOUR LIFE?: YES
2. IN THE PAST MONTH, HAVE YOU ACTUALLY HAD ANY THOUGHTS OF KILLING YOURSELF?: NO
1. IN THE PAST MONTH, HAVE YOU WISHED YOU WERE DEAD OR WISHED YOU COULD GO TO SLEEP AND NOT WAKE UP?: YES

## 2025-08-21 ENCOUNTER — OFFICE VISIT (OUTPATIENT)
Dept: BEHAVIORAL/MENTAL HEALTH CLINIC | Age: 45
End: 2025-08-21
Payer: COMMERCIAL

## 2025-08-21 VITALS
TEMPERATURE: 98.6 F | DIASTOLIC BLOOD PRESSURE: 82 MMHG | SYSTOLIC BLOOD PRESSURE: 132 MMHG | OXYGEN SATURATION: 100 % | BODY MASS INDEX: 25.96 KG/M2 | WEIGHT: 156 LBS | HEART RATE: 76 BPM

## 2025-08-21 DIAGNOSIS — F41.9 ANXIETY DISORDER, UNSPECIFIED TYPE: ICD-10-CM

## 2025-08-21 DIAGNOSIS — F33.2 MDD (MAJOR DEPRESSIVE DISORDER), RECURRENT SEVERE, WITHOUT PSYCHOSIS (HCC): Primary | ICD-10-CM

## 2025-08-21 PROCEDURE — 99214 OFFICE O/P EST MOD 30 MIN: CPT | Performed by: STUDENT IN AN ORGANIZED HEALTH CARE EDUCATION/TRAINING PROGRAM

## 2025-08-21 PROCEDURE — 3079F DIAST BP 80-89 MM HG: CPT | Performed by: STUDENT IN AN ORGANIZED HEALTH CARE EDUCATION/TRAINING PROGRAM

## 2025-08-21 PROCEDURE — 3075F SYST BP GE 130 - 139MM HG: CPT | Performed by: STUDENT IN AN ORGANIZED HEALTH CARE EDUCATION/TRAINING PROGRAM

## (undated) DEVICE — 3M™ TRANSPORE™ WHITE SURGICAL TAPE 1534-1, 1 INCH X 10 YARD (2,5CM X 9,1M), 12 ROLLS/CARTON 10 CARTONS/CASE: Brand: 3M™ TRANSPORE™

## (undated) DEVICE — SYRINGE CATH TIP 50ML

## (undated) DEVICE — SYR 3ML LL TIP 1/10ML GRAD --

## (undated) DEVICE — SOLUTION EFT TEST GI VISCOUS

## (undated) DEVICE — PROBE PH GAST 6.4FR 18CM PH 2 0CM 15 CHANNELS ES IMPED N

## (undated) DEVICE — SWABSTICK MEDICATED ADH PREP BDINE TINC BENZ

## (undated) DEVICE — TO REMOVE ADHESIVE TAPE FROM SKIN.: Brand: PDI® ADHESIVE TAPE REMOVER PAD

## (undated) DEVICE — TISSUE FACE KLNX 9X8IN --

## (undated) DEVICE — BASIN EMESIS 500CC ROSE 250/CS 60/PLT: Brand: MEDEGEN MEDICAL PRODUCTS, LLC

## (undated) DEVICE — CUP MED 1OZ CLR POLYPR FEED GRAD W/O LID